# Patient Record
(demographics unavailable — no encounter records)

---

## 2025-02-25 NOTE — DISCUSSION/SUMMARY
[FreeTextEntry1] : CONNIE is a 64 year female who presents for f/u on MICHELLE and pelvic pain. Here to discuss surgical options.  We discussed management options including observation, pelvic floor exercises with or without physical therapy, pessary, impressa, medications including imipramine and surgical management including urethral bulking agents, fascial sling, waters and midurethral sling with mesh. IUGA information for PFE and MICHELLE given to patient.  [] []   f/u All questions answered.

## 2025-02-25 NOTE — ADDENDUM
[FreeTextEntry1] : This note was written by Alysha Butler, acting as the  for Dr. Leung. This note accurately reflects the work and decisions made by Dr. Leung.

## 2025-02-25 NOTE — HISTORY OF PRESENT ILLNESS
Yes [Vaginal Wall Prolapse] : no [Rectal Prolapse] : no [Unable To Restrain Bowel Movement] : mild [Urinary Frequency] : no [Feelings Of Urinary Urgency] : mild [x2] : nocturia two times a night [Urinary Tract Infection] : mild [Constipation Obstructed Defecation] : no [Pelvic Pain] : no [Vaginal Pain] : mild [] : years ago [FreeTextEntry1] : CONNIE is a 64 year female who presents for f/u on MICHELLE and pelvic pain. 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 Negative MRI and cysto. Here to discuss surgical options for MICHELLE.   UDS with +MICHELLE, no DO, no UUI on 01/30/2025